# Patient Record
Sex: MALE | Race: WHITE
[De-identification: names, ages, dates, MRNs, and addresses within clinical notes are randomized per-mention and may not be internally consistent; named-entity substitution may affect disease eponyms.]

---

## 2021-02-12 ENCOUNTER — HOSPITAL ENCOUNTER (INPATIENT)
Dept: HOSPITAL 47 - EC | Age: 36
LOS: 2 days | Discharge: HOME | DRG: 299 | End: 2021-02-14
Attending: INTERNAL MEDICINE | Admitting: INTERNAL MEDICINE
Payer: COMMERCIAL

## 2021-02-12 DIAGNOSIS — Z20.822: ICD-10-CM

## 2021-02-12 DIAGNOSIS — F41.9: ICD-10-CM

## 2021-02-12 DIAGNOSIS — I82.449: ICD-10-CM

## 2021-02-12 DIAGNOSIS — I82.412: Primary | ICD-10-CM

## 2021-02-12 DIAGNOSIS — Z71.6: ICD-10-CM

## 2021-02-12 DIAGNOSIS — F17.210: ICD-10-CM

## 2021-02-12 DIAGNOSIS — Z88.2: ICD-10-CM

## 2021-02-12 DIAGNOSIS — I26.99: ICD-10-CM

## 2021-02-12 LAB
ALBUMIN SERPL-MCNC: 4.7 G/DL (ref 3.5–5)
ALP SERPL-CCNC: 117 U/L (ref 38–126)
ALT SERPL-CCNC: 62 U/L (ref 4–49)
ANION GAP SERPL CALC-SCNC: 11 MMOL/L
APTT BLD: 22 SEC (ref 22–30)
AST SERPL-CCNC: 39 U/L (ref 17–59)
BASOPHILS # BLD AUTO: 0 K/UL (ref 0–0.2)
BASOPHILS NFR BLD AUTO: 0 %
BUN SERPL-SCNC: 10 MG/DL (ref 9–20)
CALCIUM SPEC-MCNC: 9.7 MG/DL (ref 8.4–10.2)
CHLORIDE SERPL-SCNC: 103 MMOL/L (ref 98–107)
CO2 SERPL-SCNC: 21 MMOL/L (ref 22–30)
EOSINOPHIL # BLD AUTO: 0.3 K/UL (ref 0–0.7)
EOSINOPHIL NFR BLD AUTO: 2 %
ERYTHROCYTE [DISTWIDTH] IN BLOOD BY AUTOMATED COUNT: 5.8 M/UL (ref 4.3–5.9)
ERYTHROCYTE [DISTWIDTH] IN BLOOD: 12.8 % (ref 11.5–15.5)
GLUCOSE SERPL-MCNC: 117 MG/DL (ref 74–99)
HCT VFR BLD AUTO: 49.7 % (ref 39–53)
HGB BLD-MCNC: 16.8 GM/DL (ref 13–17.5)
INR PPP: 0.9 (ref ?–1.2)
LYMPHOCYTES # SPEC AUTO: 2.2 K/UL (ref 1–4.8)
LYMPHOCYTES NFR SPEC AUTO: 15 %
MCH RBC QN AUTO: 28.9 PG (ref 25–35)
MCHC RBC AUTO-ENTMCNC: 33.8 G/DL (ref 31–37)
MCV RBC AUTO: 85.6 FL (ref 80–100)
MONOCYTES # BLD AUTO: 0.8 K/UL (ref 0–1)
MONOCYTES NFR BLD AUTO: 5 %
NEUTROPHILS # BLD AUTO: 11 K/UL (ref 1.3–7.7)
NEUTROPHILS NFR BLD AUTO: 77 %
PLATELET # BLD AUTO: 258 K/UL (ref 150–450)
POTASSIUM SERPL-SCNC: 4.5 MMOL/L (ref 3.5–5.1)
PROT SERPL-MCNC: 8.3 G/DL (ref 6.3–8.2)
PT BLD: 9.8 SEC (ref 9–12)
SODIUM SERPL-SCNC: 135 MMOL/L (ref 137–145)
WBC # BLD AUTO: 14.3 K/UL (ref 3.8–10.6)

## 2021-02-12 PROCEDURE — 93306 TTE W/DOPPLER COMPLETE: CPT

## 2021-02-12 PROCEDURE — 83735 ASSAY OF MAGNESIUM: CPT

## 2021-02-12 PROCEDURE — 85379 FIBRIN DEGRADATION QUANT: CPT

## 2021-02-12 PROCEDURE — 96365 THER/PROPH/DIAG IV INF INIT: CPT

## 2021-02-12 PROCEDURE — 71275 CT ANGIOGRAPHY CHEST: CPT

## 2021-02-12 PROCEDURE — 85730 THROMBOPLASTIN TIME PARTIAL: CPT

## 2021-02-12 PROCEDURE — 84484 ASSAY OF TROPONIN QUANT: CPT

## 2021-02-12 PROCEDURE — 87635 SARS-COV-2 COVID-19 AMP PRB: CPT

## 2021-02-12 PROCEDURE — 99284 EMERGENCY DEPT VISIT MOD MDM: CPT

## 2021-02-12 PROCEDURE — 85610 PROTHROMBIN TIME: CPT

## 2021-02-12 PROCEDURE — 96376 TX/PRO/DX INJ SAME DRUG ADON: CPT

## 2021-02-12 PROCEDURE — 96375 TX/PRO/DX INJ NEW DRUG ADDON: CPT

## 2021-02-12 PROCEDURE — 96366 THER/PROPH/DIAG IV INF ADDON: CPT

## 2021-02-12 PROCEDURE — 85025 COMPLETE CBC W/AUTO DIFF WBC: CPT

## 2021-02-12 PROCEDURE — 36415 COLL VENOUS BLD VENIPUNCTURE: CPT

## 2021-02-12 PROCEDURE — 80048 BASIC METABOLIC PNL TOTAL CA: CPT

## 2021-02-12 PROCEDURE — 83880 ASSAY OF NATRIURETIC PEPTIDE: CPT

## 2021-02-12 PROCEDURE — 80053 COMPREHEN METABOLIC PANEL: CPT

## 2021-02-12 RX ADMIN — HEPARIN SODIUM SCH MLS/HR: 10000 INJECTION, SOLUTION INTRAVENOUS at 15:36

## 2021-02-12 RX ADMIN — CEFAZOLIN SCH MLS/HR: 330 INJECTION, POWDER, FOR SOLUTION INTRAMUSCULAR; INTRAVENOUS at 15:18

## 2021-02-12 RX ADMIN — HYDROMORPHONE HYDROCHLORIDE PRN MG: 1 INJECTION, SOLUTION INTRAMUSCULAR; INTRAVENOUS; SUBCUTANEOUS at 20:48

## 2021-02-12 NOTE — P.HPIM
History of Present Illness


H&P Date: 02/12/21


Chief Complaint: Left lower extremity swelling





This is a 35-year-old male with no significant past medical history who 

presented to the emergency room with left lower extremity swelling.  Patient 

said that he went to sleep last night and woke up this morning and noticed that 

his left lower extremity is significantly swollen and painful.  He said that 

this has been getting worse throughout the day.  He decided to come to the 

emergency room for further evaluation.  In the ER, he was found to have 

extensive DVT in the left lower extremity and evidence of PE on CT angiogram.  

Patient denies any chest pain or shortness of breath at this time.  He is 

hemodynamically stable.  He is slightly tachycardic.  No hypoxia.  Patient 

denies any history of blood clots in the past.  No recent surgeries or long 

trips.  He is a smoker and smokes approximately one pack per day.  He does not 

have any underlying medical history otherwise.  No family history of blood 

clots.





Review of Systems





Review of system:


14 points review of systems were obtained and were negative except to what were 

mentioned in the HPI.





Past Medical History


Past Medical History: No Reported History


History of Any Multi-Drug Resistant Organisms: None Reported


Past Surgical History: No Surgical Hx Reported


Past Psychological History: No Psychological Hx Reported


Smoking Status: Current every day smoker


Past Alcohol Use History: Occasional


Past Drug Use History: None Reported





Medications and Allergies


                                Home Medications











 Medication  Instructions  Recorded  Confirmed  Type


 


No Known Home Medications  02/12/21 02/12/21 History








                                    Allergies











Allergy/AdvReac Type Severity Reaction Status Date / Time


 


Sulfa (Sulfonamide Allergy  Nausea & Verified 02/12/21 15:14





Antibiotics)   Vomiting &  





   Diarrhea  














Physical Exam


Vitals: 


                                   Vital Signs











  Temp Pulse Resp BP Pulse Ox


 


 02/12/21 15:47   118 H  20  142/102  94 L


 


 02/12/21 14:47   121 H  18   98


 


 02/12/21 14:00   121 H  20   94 L


 


 02/12/21 13:05  98.2 F  134 H  20  161/81  98








                                Intake and Output











 02/12/21 02/12/21 02/12/21





 06:59 14:59 22:59


 


Other:   


 


  Weight  136.078 kg 














General:  The patient is awake and alert, in no distress


Eye: there is normal conjunctiva bilaterally.  


Neck:  The neck is supple, there is no  JVD.  


Cardiovascular:  Normal  S1-S2, no S3-S4, no murmurs.


Respiratory: Lungs clear to auscultation bilaterally


Gastrointestinal: Abdomen is soft, nontender


Musculoskeletal:  There is significant swelling involving the left lower 

extremity from the ankle all the way up to the hip.  


Neurological:. Speech is normal. 


Skin:  Skin is warm and dry 





Results


CBC & Chem 7: 


                                 02/12/21 13:58





                                 02/12/21 13:58


Labs: 


                  Abnormal Lab Results - Last 24 Hours (Table)











  02/12/21 02/12/21 02/12/21 Range/Units





  13:58 13:58 13:58 


 


WBC  14.3 H    (3.8-10.6)  k/uL


 


Neutrophils #  11.0 H    (1.3-7.7)  k/uL


 


D-Dimer   5.24 H   (<0.60)  mg/L FEU


 


Sodium    135 L  (137-145)  mmol/L


 


Carbon Dioxide    21 L  (22-30)  mmol/L


 


Glucose    117 H  (74-99)  mg/dL


 


ALT    62 H  (4-49)  U/L


 


Total Protein    8.3 H  (6.3-8.2)  g/dL














Assessment and Plan


Assessment: 








1.  Acute PE, with evidence of a large thrombus in the right middle and lower 

lobe pulmonary artery junction.  No evidence of right ventricular strain on CT. 

I would obtain echocardiogram for further evaluation.  Started on 

anticoagulation with IV heparin.





2.  Acute DVT involving left lower extremity with extensive clot burden





3.  Tobacco abuse, counseled extensively to quit





Today, I reviewed his medication list and lab work results.  I will consult 

vascular surgery and hematology for further evaluation.  Continue telemetry 

monitoring.  Continue IV fluid hydration with normal saline at 75 mL per hour.  

Repeat lab work in the morning.

## 2021-02-12 NOTE — ED
General Adult HPI





- General


Chief complaint: Extremity Injury, Lower


Stated complaint: L leg swollen


Time Seen by Provider: 02/12/21 13:13


Source: patient, RN notes reviewed


Mode of arrival: ambulatory


Limitations: no limitations





- History of Present Illness


Initial comments: 





Patient is a 35-year-old male that comes emergency Department complaining of l

eft lower extremity swelling.  He noted that this morning he woke up his left 

calf and thigh were swollen, tight and purple.  She did not have any history of 

bleeding disorders, prior DVTs.  He noted that he tried gluing his symptoms and 

elevating his like to see the swelling go down but it did not speak in the 

emergency room.  He denied any recent trauma injury surgery to the lower 

extremity.  He stated that his leg is not painful when he is not walking, is dry

sitting in wheelchair in no apparent distress or pain.  He did not that usual 

anxious about what could be going on.  He denied any chest pain shortness of 

breath headache nausea vomiting diarrhea constipation fever fatigue chills.





- Related Data


                                    Allergies











Allergy/AdvReac Type Severity Reaction Status Date / Time


 


Sulfa (Sulfonamide Allergy  Nausea & Verified 02/12/21 13:08





Antibiotics)   Vomiting &  





   Diarrhea  














Review of Systems


ROS Statement: 


Those systems with pertinent positive or pertinent negative responses have been 

documented in the HPI.





ROS Other: All systems not noted in ROS Statement are negative.





Past Medical History


Past Medical History: No Reported History


History of Any Multi-Drug Resistant Organisms: None Reported


Past Surgical History: No Surgical Hx Reported


Past Psychological History: No Psychological Hx Reported


Smoking Status: Current every day smoker


Past Alcohol Use History: Occasional


Past Drug Use History: None Reported





General Exam


Limitations: no limitations


General appearance: alert, in no apparent distress


Head exam: Present: atraumatic, normocephalic, normal inspection


Eye exam: Present: normal appearance, PERRL, EOMI.  Absent: scleral icterus, 

conjunctival injection, periorbital swelling


ENT exam: Present: normal exam, mucous membranes moist


Neck exam: Present: normal inspection.  Absent: tenderness, meningismus, 

lymphadenopathy


Respiratory exam: Present: normal lung sounds bilaterally.  Absent: respiratory 

distress, wheezes, rales, rhonchi, stridor


Cardiovascular Exam: Present: regular rate, normal rhythm, normal heart sounds, 

other (Weak pedal pulses left lower extremity).  Absent: systolic murmur, 

diastolic murmur, rubs, gallop, clicks


GI/Abdominal exam: Present: soft, normal bowel sounds.  Absent: distended, 

tenderness, guarding, rebound, rigid


Extremities exam: Present: normal inspection, full ROM, tenderness (Left lower 

extremity tender and tight to palpation.), normal capillary refill (Slow in left

lower extremity), calf tenderness (Left side).  Absent: pedal edema, joint 

swelling


Neurological exam: Present: alert, oriented X3, CN II-XII intact


Psychiatric exam: Present: normal affect, normal mood


Skin exam: Present: warm, dry, intact, normal color.  Absent: rash





Course


                                   Vital Signs











  02/12/21 02/12/21 02/12/21





  13:05 14:00 14:47


 


Temperature 98.2 F  


 


Pulse Rate 134 H 121 H 121 H


 


Respiratory 20 20 18





Rate   


 


Blood Pressure 161/81  


 


O2 Sat by Pulse 98 94 L 98





Oximetry   














Medical Decision Making





- Medical Decision Making





35-year-old male complaining of left lower x-ray swelling, tightness, pain on 

walking.


Venous Doppler ordered.


Patient did not want any pain medication as she was comfortable sitting in 

wheelchair.


Dr. Hussein


Heparin IV was started.


Ativan was ordered for anxiety.,


Case discussed with Dr. Quiroz, was decided to admit patient to hospital.





- Lab Data


Result diagrams: 


                                 02/12/21 13:58





                                 02/12/21 13:58


                                   Lab Results











  02/12/21 02/12/21 02/12/21 Range/Units





  13:58 13:58 13:58 


 


WBC  14.3 H    (3.8-10.6)  k/uL


 


RBC  5.80    (4.30-5.90)  m/uL


 


Hgb  16.8    (13.0-17.5)  gm/dL


 


Hct  49.7    (39.0-53.0)  %


 


MCV  85.6    (80.0-100.0)  fL


 


MCH  28.9    (25.0-35.0)  pg


 


MCHC  33.8    (31.0-37.0)  g/dL


 


RDW  12.8    (11.5-15.5)  %


 


Plt Count  258    (150-450)  k/uL


 


MPV  7.1    


 


Neutrophils %  77    %


 


Lymphocytes %  15    %


 


Monocytes %  5    %


 


Eosinophils %  2    %


 


Basophils %  0    %


 


Neutrophils #  11.0 H    (1.3-7.7)  k/uL


 


Lymphocytes #  2.2    (1.0-4.8)  k/uL


 


Monocytes #  0.8    (0-1.0)  k/uL


 


Eosinophils #  0.3    (0-0.7)  k/uL


 


Basophils #  0.0    (0-0.2)  k/uL


 


D-Dimer   5.24 H   (<0.60)  mg/L FEU


 


Sodium    135 L  (137-145)  mmol/L


 


Potassium    4.5  (3.5-5.1)  mmol/L


 


Chloride    103  ()  mmol/L


 


Carbon Dioxide    21 L  (22-30)  mmol/L


 


Anion Gap    11  mmol/L


 


BUN    10  (9-20)  mg/dL


 


Creatinine    0.86  (0.66-1.25)  mg/dL


 


Est GFR (CKD-EPI)AfAm    >90  (>60 ml/min/1.73 sqM)  


 


Est GFR (CKD-EPI)NonAf    >90  (>60 ml/min/1.73 sqM)  


 


Glucose    117 H  (74-99)  mg/dL


 


Calcium    9.7  (8.4-10.2)  mg/dL


 


Total Bilirubin    0.8  (0.2-1.3)  mg/dL


 


AST    39  (17-59)  U/L


 


ALT    62 H  (4-49)  U/L


 


Alkaline Phosphatase    117  ()  U/L


 


Total Protein    8.3 H  (6.3-8.2)  g/dL


 


Albumin    4.7  (3.5-5.0)  g/dL














- Radiology Data


Radiology results: report reviewed, image reviewed


Positive for DVT from the common femoral vein to the proximal calf veins.





Disposition


Clinical Impression: 


 Deep vein thrombosis





Disposition: ADMITTED AS IP TO THIS \[facility]\""


Condition: Good


Is patient prescribed a controlled substance at d/c from ED?: No


Referrals: 


None,Stated [Primary Care Provider] - 1-2 days

## 2021-02-12 NOTE — US
EXAMINATION TYPE: US venous doppler duplex LE LT

 

DATE OF EXAM: 2/12/2021 1:56 PM

 

COMPARISON: NONE

 

CLINICAL HISTORY: Left lower extremity swelling. Left leg pain and swelling x 1 day

 

SIDE PERFORMED: Left  

 

TECHNIQUE:  The lower extremity deep venous system is examined utilizing real time linear array sonog
boris with graded compression, doppler sonography and color-flow sonography.

 

VESSELS IMAGED:

Common Femoral Vein

Deep Femoral Vein

Greater Saphenous Vein *

Femoral Vein

Popliteal Vein

Small Saphenous Vein *

Proximal Calf Veins

(* superficial vessels)

 

 

 

Left Leg:  Positive for DVT from CFV to proximal calf veins 

 

 

 

IMPRESSION: Positive DVT as noted above.

## 2021-02-12 NOTE — CT
CT CHEST FOR PULMONARY EMBOLISM.

 

EXAMINATION TYPE: CT chest angio for PE

 

DATE OF EXAM: 2/12/2021

 

INDICATION: tachycardia

 

CT DLP: 1023 mGycm, Automated exposure control for dose reduction was used.

 

CONTRAST: Patient injected with 100 mL of Isovue 370.

 

COMPARISON: None

 

TECHNIQUE: CT of the chest is performed on a spiral scan at 2 mm thick sections.  Study is performed 
with intravenous contrast timed for evaluation for pulmonary embolism.  This will limit additional po
rtions of the evaluation.  3-D MIP images reconstructed by the technologist are reviewed on the compu
ter in the coronal and sagittal planes. 

 

FINDINGS:

There is a large thrombus at the right middle and lower lobe pulmonary artery junction. Lower lobe bi
lateral pulmonary emboli are evident. No right heart strain is evident.

 

No mediastinal or hilar adenopathy enlarged by CT criteria is evident.  The ascending aorta diameter 
at the level of the main pulmonary artery is 3.2 cm.  The main pulmonary artery diameter at the bifur
cation is 2.9 cm.

 

There is a groundglass opacity or density within the posterior left apex. Follow-up is recommended.

 

Limited CT section through the upper abdomen. There is moderate fatty infiltration liver.

 

IMPRESSIONS:

1. Pulmonary embolism.

2. Groundglass opacity or density within the left apex. This could be followed.

## 2021-02-13 LAB
ANION GAP SERPL CALC-SCNC: 9 MMOL/L
BASOPHILS # BLD AUTO: 0.1 K/UL (ref 0–0.2)
BASOPHILS NFR BLD AUTO: 1 %
BUN SERPL-SCNC: 12 MG/DL (ref 9–20)
CALCIUM SPEC-MCNC: 9 MG/DL (ref 8.4–10.2)
CHLORIDE SERPL-SCNC: 103 MMOL/L (ref 98–107)
CO2 SERPL-SCNC: 24 MMOL/L (ref 22–30)
EOSINOPHIL # BLD AUTO: 0.4 K/UL (ref 0–0.7)
EOSINOPHIL NFR BLD AUTO: 3 %
ERYTHROCYTE [DISTWIDTH] IN BLOOD BY AUTOMATED COUNT: 5.34 M/UL (ref 4.3–5.9)
ERYTHROCYTE [DISTWIDTH] IN BLOOD: 12.7 % (ref 11.5–15.5)
GLUCOSE SERPL-MCNC: 90 MG/DL (ref 74–99)
HCT VFR BLD AUTO: 46.7 % (ref 39–53)
HGB BLD-MCNC: 15.6 GM/DL (ref 13–17.5)
LYMPHOCYTES # SPEC AUTO: 3.1 K/UL (ref 1–4.8)
LYMPHOCYTES NFR SPEC AUTO: 25 %
MAGNESIUM SPEC-SCNC: 2.2 MG/DL (ref 1.6–2.3)
MCH RBC QN AUTO: 29.2 PG (ref 25–35)
MCHC RBC AUTO-ENTMCNC: 33.4 G/DL (ref 31–37)
MCV RBC AUTO: 87.5 FL (ref 80–100)
MONOCYTES # BLD AUTO: 0.7 K/UL (ref 0–1)
MONOCYTES NFR BLD AUTO: 6 %
NEUTROPHILS # BLD AUTO: 7.8 K/UL (ref 1.3–7.7)
NEUTROPHILS NFR BLD AUTO: 64 %
PLATELET # BLD AUTO: 236 K/UL (ref 150–450)
POTASSIUM SERPL-SCNC: 4.3 MMOL/L (ref 3.5–5.1)
SODIUM SERPL-SCNC: 136 MMOL/L (ref 137–145)
WBC # BLD AUTO: 12.3 K/UL (ref 3.8–10.6)

## 2021-02-13 RX ADMIN — ACETAMINOPHEN PRN MG: 325 TABLET, FILM COATED ORAL at 23:36

## 2021-02-13 RX ADMIN — HEPARIN SODIUM SCH MLS/HR: 10000 INJECTION, SOLUTION INTRAVENOUS at 23:38

## 2021-02-13 RX ADMIN — HYDROMORPHONE HYDROCHLORIDE PRN MG: 1 INJECTION, SOLUTION INTRAMUSCULAR; INTRAVENOUS; SUBCUTANEOUS at 15:07

## 2021-02-13 RX ADMIN — CEFAZOLIN SCH MLS/HR: 330 INJECTION, POWDER, FOR SOLUTION INTRAMUSCULAR; INTRAVENOUS at 06:38

## 2021-02-13 RX ADMIN — HYDROMORPHONE HYDROCHLORIDE PRN MG: 1 INJECTION, SOLUTION INTRAMUSCULAR; INTRAVENOUS; SUBCUTANEOUS at 18:34

## 2021-02-13 RX ADMIN — HEPARIN SODIUM SCH MLS/HR: 10000 INJECTION, SOLUTION INTRAVENOUS at 11:59

## 2021-02-13 RX ADMIN — ACETAMINOPHEN PRN MG: 325 TABLET, FILM COATED ORAL at 08:43

## 2021-02-13 RX ADMIN — HYDROMORPHONE HYDROCHLORIDE PRN MG: 1 INJECTION, SOLUTION INTRAMUSCULAR; INTRAVENOUS; SUBCUTANEOUS at 11:58

## 2021-02-13 RX ADMIN — HYDROMORPHONE HYDROCHLORIDE PRN MG: 1 INJECTION, SOLUTION INTRAMUSCULAR; INTRAVENOUS; SUBCUTANEOUS at 01:44

## 2021-02-13 RX ADMIN — ACETAMINOPHEN PRN MG: 325 TABLET, FILM COATED ORAL at 15:06

## 2021-02-13 RX ADMIN — HYDROMORPHONE HYDROCHLORIDE PRN MG: 1 INJECTION, SOLUTION INTRAMUSCULAR; INTRAVENOUS; SUBCUTANEOUS at 21:03

## 2021-02-13 RX ADMIN — HEPARIN SODIUM SCH MLS/HR: 10000 INJECTION, SOLUTION INTRAVENOUS at 01:39

## 2021-02-13 RX ADMIN — HYDROMORPHONE HYDROCHLORIDE PRN MG: 1 INJECTION, SOLUTION INTRAMUSCULAR; INTRAVENOUS; SUBCUTANEOUS at 06:36

## 2021-02-13 NOTE — P.GSCN
History of Present Illness


Consult date: 02/13/21


History of present illness: 





Mayito is a 35-year-old male with significant past medical history for tobacco 

abuse.  He presented to the ER for the fluctuant swelling and breathing 

difficulty and was found to have a DVT from the femoral to posterior tibial 

veins as well as a bilateral pulmonary emboli.  He otherwise has no significant 

past medical history.  He denies any recent travel.  He denies any trauma or 

recent surgical immobility.  He denies any recent illnesses.  There is no family

history of blood clots that he is aware of.  He usually works construction and 

has been somewhat slower, but still ambulates regularly in house





Review of Systems





14 point review systems performed.  Pertinent positives and negatives per the 

HPI.





Past Medical History


Past Medical History: No Reported History


History of Any Multi-Drug Resistant Organisms: None Reported


Past Surgical History: No Surgical Hx Reported


Past Anesthesia/Blood Transfusion Reactions: No Reported Reaction


Past Psychological History: No Psychological Hx Reported


Smoking Status: Current every day smoker


Past Alcohol Use History: Occasional


Additional Past Alcohol Use History / Comment(s): 1 drink on mondays


Past Drug Use History: None Reported





- Past Family History


  ** Mother


Family Medical History: No Reported History





  ** Father


History Unknown: Yes





Medications and Allergies


                                Home Medications











 Medication  Instructions  Recorded  Confirmed  Type


 


Rivaroxaban [Xarelto Starter Pack] 0 mg PO AS DIRECTED 30 Days #1 pack 02/13/21 

Rx








                                    Allergies











Allergy/AdvReac Type Severity Reaction Status Date / Time


 


Sulfa (Sulfonamide Allergy  Nausea & Verified 02/12/21 15:14





Antibiotics)   Vomiting &  





   Diarrhea  














Surgical - Exam


                                   Vital Signs











Temp Pulse Resp BP Pulse Ox


 


 98.2 F   134 H  20   161/81   98 


 


 02/12/21 13:05  02/12/21 13:05  02/12/21 13:05  02/12/21 13:05  02/12/21 13:05














genitals a pleasant cooperative obese male in no acute distress.  HEENT is 

normocephalic, atraumatic, extraction motion intact.  Heart is regular in rate 

and rhythm.  Lungs are clear bilaterally.  Abdomen is soft, nontender and 

nondistended.  Extremities with 1+ edema the left lower extremity and somewhat 

taut.  Palpable pedal pulses bilaterally.  Normal mood and affect.  Cranial 

nerves II through XII grossly intact





Results





computed tomography scan and ultrasound are reviewed.  No evidence of 

significant right heart strain on computed tomography scan.  Echocardiogram 

results reviewed.  No evidence of right heart strain





- Labs





                                 02/13/21 04:17





                                 02/13/21 04:17


                  Abnormal Lab Results - Last 24 Hours (Table)











  02/12/21 02/12/21 02/12/21 Range/Units





  13:58 13:58 13:58 


 


WBC  14.3 H    (3.8-10.6)  k/uL


 


Neutrophils #  11.0 H    (1.3-7.7)  k/uL


 


APTT     (22.0-30.0)  sec


 


D-Dimer   5.24 H   (<0.60)  mg/L FEU


 


Sodium    135 L  (137-145)  mmol/L


 


Carbon Dioxide    21 L  (22-30)  mmol/L


 


Glucose    117 H  (74-99)  mg/dL


 


ALT    62 H  (4-49)  U/L


 


Total Protein    8.3 H  (6.3-8.2)  g/dL














  02/12/21 02/13/21 02/13/21 Range/Units





  22:31 04:17 04:17 


 


WBC   12.3 H   (3.8-10.6)  k/uL


 


Neutrophils #   7.8 H   (1.3-7.7)  k/uL


 


APTT  43.7 H    (22.0-30.0)  sec


 


D-Dimer     (<0.60)  mg/L FEU


 


Sodium    136 L  (137-145)  mmol/L


 


Carbon Dioxide     (22-30)  mmol/L


 


Glucose     (74-99)  mg/dL


 


ALT     (4-49)  U/L


 


Total Protein     (6.3-8.2)  g/dL








                                 Diabetes panel











  02/12/21 02/13/21 Range/Units





  13:58 04:17 


 


Sodium  135 L  136 L  (137-145)  mmol/L


 


Potassium  4.5  4.3  (3.5-5.1)  mmol/L


 


Chloride  103  103  ()  mmol/L


 


Carbon Dioxide  21 L  24  (22-30)  mmol/L


 


BUN  10  12  (9-20)  mg/dL


 


Creatinine  0.86  0.89  (0.66-1.25)  mg/dL


 


Glucose  117 H  90  (74-99)  mg/dL


 


Calcium  9.7  9.0  (8.4-10.2)  mg/dL


 


AST  39   (17-59)  U/L


 


ALT  62 H   (4-49)  U/L


 


Alkaline Phosphatase  117   ()  U/L


 


Total Protein  8.3 H   (6.3-8.2)  g/dL


 


Albumin  4.7   (3.5-5.0)  g/dL








                                  Calcium panel











  02/12/21 02/13/21 Range/Units





  13:58 04:17 


 


Calcium  9.7  9.0  (8.4-10.2)  mg/dL


 


Albumin  4.7   (3.5-5.0)  g/dL








                                 Pituitary panel











  02/12/21 02/13/21 Range/Units





  13:58 04:17 


 


Sodium  135 L  136 L  (137-145)  mmol/L


 


Potassium  4.5  4.3  (3.5-5.1)  mmol/L


 


Chloride  103  103  ()  mmol/L


 


Carbon Dioxide  21 L  24  (22-30)  mmol/L


 


BUN  10  12  (9-20)  mg/dL


 


Creatinine  0.86  0.89  (0.66-1.25)  mg/dL


 


Glucose  117 H  90  (74-99)  mg/dL


 


Calcium  9.7  9.0  (8.4-10.2)  mg/dL








                                  Adrenal panel











  02/12/21 02/13/21 Range/Units





  13:58 04:17 


 


Sodium  135 L  136 L  (137-145)  mmol/L


 


Potassium  4.5  4.3  (3.5-5.1)  mmol/L


 


Chloride  103  103  ()  mmol/L


 


Carbon Dioxide  21 L  24  (22-30)  mmol/L


 


BUN  10  12  (9-20)  mg/dL


 


Creatinine  0.86  0.89  (0.66-1.25)  mg/dL


 


Glucose  117 H  90  (74-99)  mg/dL


 


Calcium  9.7  9.0  (8.4-10.2)  mg/dL


 


Total Bilirubin  0.8   (0.2-1.3)  mg/dL


 


AST  39   (17-59)  U/L


 


ALT  62 H   (4-49)  U/L


 


Alkaline Phosphatase  117   ()  U/L


 


Total Protein  8.3 H   (6.3-8.2)  g/dL


 


Albumin  4.7   (3.5-5.0)  g/dL














Assessment and Plan


Assessment: 


#1 left lower extremity DVT


#2 pulmonary embolism


3.tobacco abuse


Plan: 





at this point there are no signs of right heart strain and no further need for 

planned intervention.  He can can be transitioned to oral anticoagulation and 

discharged home from  standpoint.  We will follow-up with him as an outpat

ient.  He may continue activity as he can tolerate it.  He is to wear 

compression stockings and elevate his lower extremities when he can .  We did 

discuss the importance of tobacco cessation

## 2021-02-13 NOTE — ECHOF
Referral Reason:PE



MEASUREMENTS

--------

HEIGHT: 193.0 cm

WEIGHT: 145.6 kg

BP: 123/72

RVIDd:   2.6 cm     (< 3.3)

IVSd:   1.3 cm     (0.6 - 1.1)

LVIDd:   5.2 cm     (3.9 - 5.3)

LVPWd:   1.1 cm     (0.6 - 1.1)

IVSs:   1.6 cm

LVIDs:   3.5 cm

LVPWs:   1.6 cm

LA Diam:   3.7 cm     (2.7 - 3.8)

Ao Diam:   3.3 cm     (2.0 - 3.7)

AV Cusp:   2.5 cm     (1.5 - 2.6)

MV EXCURSION:   19.783 mm     (> 18.000)

MV EF SLOPE:   92 mm/s     (70 - 150)

EPSS:   0.9 cm

MV E Mark:   0.75 m/s

MV DecT:   349 ms

MV A Mark:   0.38 m/s

MV E/A Ratio:   2.01 

RAP:   5.00 mmHg

RVSP:   21.28 mmHg







FINDINGS

--------

Sinus rhythm.

This was a technically adequate study.

The left ventricular size is normal.   There is mild concentric left ventricular hypertrophy.   Overa
ll left ventricular systolic function is normal with, an EF between 60 - 65 %.

The right ventricle is normal in size.

The left atrium is normal in size.

The right atrium is normal in size.

The aortic valve is trileaflet and appears structurally normal.

The mitral valve is normal.

Trace tricuspid regurgitation present.   Right ventricular systolic pressure is normal at < 35 mmHg.

There is no pulmonic regurgitation present.

The aortic root size is normal.

There is no pericardial effusion.



CONCLUSIONS

--------

1. The left ventricular size is normal.

2. There is mild concentric left ventricular hypertrophy.

3. Overall left ventricular systolic function is normal with, an EF between 60 - 65 %.

4. Trace tricuspid regurgitation present.

5. There is no pulmonic regurgitation present.

6. There is no pericardial effusion.





SONOGRAPHER: Felicity Leonard [facility]

## 2021-02-13 NOTE — P.CONS
History of Present Illness





- Reason for Consult


Consult date: 02/13/21


DVT/PE


Requesting physician: Amparo Munguia





- Chief Complaint


Left lower extremity swelling





- History of Present Illness





Mr. Marti is a very pleasant 35-year-old  male we've been asked to 

see for left lower extremity DVT and bilateral PE.  Patient has a benign past 

medical history, he is positive for smoking 1 pack per day, no surgical history,

not on any medications for chronic medical conditions.  He denies recent long 

travel or immobility, he has been off of work but, he is intermittently 

ambulatory and around throughout the day, no injury, recent illness, steroid or 

hormonal use, there is no family history of blood clots, no family history of 

cancer.  He states that he went to bed and woke up the next day with the lower 

extremity swelling, this persisted, the leg felt tight, he started looking 

things up on the Internet and came across blood clots so, he decided to be 

evaluated.  He denies any chest pain, shortness of breath, pleuritic type chest 

pain with inspiration.





Review of Systems





14 point review of systems is negative except as stated in HPI





Past Medical History


Past Medical History: No Reported History


History of Any Multi-Drug Resistant Organisms: None Reported


Past Surgical History: No Surgical Hx Reported


Past Anesthesia/Blood Transfusion Reactions: No Reported Reaction


Past Psychological History: No Psychological Hx Reported


Smoking Status: Current every day smoker


Past Alcohol Use History: Occasional


Additional Past Alcohol Use History / Comment(s): 1 drink on mondays


Past Drug Use History: None Reported





- Past Family History


  ** Mother


Family Medical History: No Reported History





  ** Father


History Unknown: Yes





Medications and Allergies


                                Home Medications











 Medication  Instructions  Recorded  Confirmed  Type


 


No Known Home Medications  02/12/21 02/12/21 History








                                    Allergies











Allergy/AdvReac Type Severity Reaction Status Date / Time


 


Sulfa (Sulfonamide Allergy  Nausea & Verified 02/12/21 15:14





Antibiotics)   Vomiting &  





   Diarrhea  














Physical Exam


Vitals: 


                                   Vital Signs











  Temp Pulse Pulse Resp BP BP Pulse Ox


 


 02/13/21 07:54  97.9 F   95  18   130/69  93 L


 


 02/13/21 04:00  97.8 F   95  17   123/72  100


 


 02/13/21 02:00    95    


 


 02/12/21 23:13  97.8 F   95  17   118/55  97


 


 02/12/21 20:00    117 H  16   


 


 02/12/21 17:45  98.6 F   117 H  16   143/82  95


 


 02/12/21 15:47   118 H   20  142/102   94 L


 


 02/12/21 14:47   121 H   18    98


 


 02/12/21 14:00   121 H   20    94 L


 


 02/12/21 13:05  98.2 F  134 H   20  161/81   98








                                Intake and Output











 02/12/21 02/13/21 02/13/21





 22:59 06:59 14:59


 


Intake Total 620 179.977 210


 


Output Total  950 


 


Balance 620 -770.023 210


 


Intake:   


 


  IV 20 10 10


 


    Invasive Line 1 20 10 10


 


  Intake, IV Titration  169.977 





  Amount   


 


    Heparin Sod,Pork in 0.45%  169.977 





    NaCl 25,000 unit In 0.45   





    % NaCl 1 250ml.bag @ 16.   





    89 UNITS/KG/HR 22.984 mls   





    /hr IV .[id number] [facility] Rx#:   





    [id number]   


 


  Oral 600  200


 


Output:   


 


  Urine  950 


 


Other:   


 


  Voiding Method Toilet Toilet Toilet





 Urinal Urinal Urinal


 


  Weight 136.078 kg 146 kg 














- Constitutional


General appearance: cooperative, no acute distress, obese





- EENT


Eyes: anicteric sclerae, EOMI, dentition normal


ENT: hearing grossly normal, normal oropharynx





- Neck


Neck: no lymphadenopathy





- Respiratory


Respiratory: bilateral: CTA





- Cardiovascular


Rhythm: regular


Heart sounds: normal: S1, S2


Abnormal Heart Sounds: no systolic murmur, no diastolic murmur, no rub, no S3 

Gallop, no S4 Gallop, no click, no other


  ** leg


Peripheral Edema: right: None, left: 3+





- Gastrointestinal


General gastrointestinal: no absent bowel sounds, no decreased bowel sounds, no 

distended, no hepatomegaly, no hyperactive bowel sounds, normal bowel sounds, no

organomegaly, no rigid, no scaphoid, soft, no splenomegaly, no tenderness, no 

umbilical hernia, no ventral hernia





- Integumentary


Integumentary: normal





- Neurologic


Neurologic: CNII-XII intact





- Musculoskeletal


Musculoskeletal: strength equal bilaterally





- Psychiatric


Psychiatric: A&O x's 3, appropriate affect, intact judgment & insight





Results


CBC & Chem 7: 


                                 02/13/21 04:17





                                 02/13/21 04:17


Labs: 


                  Abnormal Lab Results - Last 24 Hours (Table)











  02/12/21 02/12/21 02/12/21 Range/Units





  13:58 13:58 13:58 


 


WBC  14.3 H    (3.8-10.6)  k/uL


 


Neutrophils #  11.0 H    (1.3-7.7)  k/uL


 


APTT     (22.0-30.0)  sec


 


D-Dimer   5.24 H   (<0.60)  mg/L FEU


 


Sodium    135 L  (137-145)  mmol/L


 


Carbon Dioxide    21 L  (22-30)  mmol/L


 


Glucose    117 H  (74-99)  mg/dL


 


ALT    62 H  (4-49)  U/L


 


Total Protein    8.3 H  (6.3-8.2)  g/dL














  02/12/21 02/13/21 02/13/21 Range/Units





  22:31 04:17 04:17 


 


WBC   12.3 H   (3.8-10.6)  k/uL


 


Neutrophils #   7.8 H   (1.3-7.7)  k/uL


 


APTT  43.7 H    (22.0-30.0)  sec


 


D-Dimer     (<0.60)  mg/L FEU


 


Sodium    136 L  (137-145)  mmol/L


 


Carbon Dioxide     (22-30)  mmol/L


 


Glucose     (74-99)  mg/dL


 


ALT     (4-49)  U/L


 


Total Protein     (6.3-8.2)  g/dL











CT scan - chest: report reviewed


Venous US: report reviewed





Assessment and Plan


(1) Pulmonary embolism


Current Visit: Yes   Status: Acute   Priority: High   Code(s): I26.99 - OTHER 

PULMONARY EMBOLISM WITHOUT ACUTE COR PULMONALE   SNOMED Code(s): 63401432


   





(2) Deep vein thrombosis


Current Visit: Yes   Status: Acute   Priority: High   Code(s): I82.409 - ACUTE 

EMBOLISM AND THOMBOS UNSP DEEP VN UNSP LOWER EXTREMITY   SNOMED Code(s): 

314183990


   


Plan: 





Weak provoking factor of smoking and this 35-year-old male with left lower 

extremity DVT and bilateral PE. Would recommend 6mo to 1 year anticoauglation.  

Hypercoaguable work up outpt. 


Patient is on heparin drip.  Send prescription for Xarelto to verify co-pay.  

Patient said he would pay for his but, we will see if we can get him any type of

assistance.


Reviewed bleeding precautions with the patient.


Case briefly discussed with Vascular MD.


Continue with heparin treatment until verification of oral anticoagulation 

confirmed and the patient can get the prescription.  He is still currently 

pending the ECHO results.

## 2021-02-14 VITALS
HEART RATE: 90 BPM | RESPIRATION RATE: 16 BRPM | TEMPERATURE: 97.4 F | SYSTOLIC BLOOD PRESSURE: 120 MMHG | DIASTOLIC BLOOD PRESSURE: 65 MMHG

## 2021-02-14 LAB
BASOPHILS # BLD AUTO: 0.1 K/UL (ref 0–0.2)
BASOPHILS NFR BLD AUTO: 1 %
EOSINOPHIL # BLD AUTO: 0.5 K/UL (ref 0–0.7)
EOSINOPHIL NFR BLD AUTO: 5 %
ERYTHROCYTE [DISTWIDTH] IN BLOOD BY AUTOMATED COUNT: 5.13 M/UL (ref 4.3–5.9)
ERYTHROCYTE [DISTWIDTH] IN BLOOD: 13.1 % (ref 11.5–15.5)
HCT VFR BLD AUTO: 45 % (ref 39–53)
HGB BLD-MCNC: 15.1 GM/DL (ref 13–17.5)
LYMPHOCYTES # SPEC AUTO: 2.7 K/UL (ref 1–4.8)
LYMPHOCYTES NFR SPEC AUTO: 30 %
MCH RBC QN AUTO: 29.4 PG (ref 25–35)
MCHC RBC AUTO-ENTMCNC: 33.6 G/DL (ref 31–37)
MCV RBC AUTO: 87.6 FL (ref 80–100)
MONOCYTES # BLD AUTO: 0.6 K/UL (ref 0–1)
MONOCYTES NFR BLD AUTO: 7 %
NEUTROPHILS # BLD AUTO: 4.9 K/UL (ref 1.3–7.7)
NEUTROPHILS NFR BLD AUTO: 55 %
PLATELET # BLD AUTO: 236 K/UL (ref 150–450)
WBC # BLD AUTO: 8.9 K/UL (ref 3.8–10.6)

## 2021-02-14 RX ADMIN — ACETAMINOPHEN PRN MG: 325 TABLET, FILM COATED ORAL at 08:59

## 2021-02-14 RX ADMIN — HYDROMORPHONE HYDROCHLORIDE PRN MG: 1 INJECTION, SOLUTION INTRAMUSCULAR; INTRAVENOUS; SUBCUTANEOUS at 06:51

## 2021-02-14 RX ADMIN — HYDROMORPHONE HYDROCHLORIDE PRN MG: 1 INJECTION, SOLUTION INTRAMUSCULAR; INTRAVENOUS; SUBCUTANEOUS at 12:03

## 2021-02-14 RX ADMIN — HEPARIN SODIUM SCH MLS/HR: 10000 INJECTION, SOLUTION INTRAVENOUS at 08:58

## 2021-02-14 RX ADMIN — HYDROMORPHONE HYDROCHLORIDE PRN MG: 1 INJECTION, SOLUTION INTRAMUSCULAR; INTRAVENOUS; SUBCUTANEOUS at 10:47

## 2021-02-14 NOTE — P.DS
Providers


Date of admission: 


02/12/21 16:56





Expected date of discharge: 02/14/21


Attending physician: 


J Carlos Hussein MD





Consults: 





                                        





02/12/21 16:06


Consult Physician Routine 


   Consulting Provider: Dawn Gilbert


   Consult Reason/Comments: PE/DVT


   Do you want consulting provider notified?: Yes





02/12/21 16:07


Consult Physician Routine 


   Consulting Provider: Prasanna Barroso


   Consult Reason/Comments: DVT/PE


   Do you want consulting provider notified?: Yes











Primary care physician: 


Stated None





Hospital Course: 





This is a 35-year-old male with no known past medical history of presented to [facility] emergency room with sudden onset left lower extremity swelling that happened 

overnight.  He was evaluated in the ER and admitted to the hospital for further 

management of his medical problems noted below.





1.  Acute PE, with evidence of a large thrombus in the right middle and lower 

lobe pulmonary artery junction.  No evidence of right ventricular strain on CT 

or echocardiogram.  Patient was seen and evaluated by hematology and vascular 

surgery during this admission.





2.  Acute DVT involving left lower extremity with extensive clot burden.  

Continue leg elevation.  Pain control as needed.





3.  Tobacco abuse, counseled extensively to quit





Patient was admitted to the hospital and treated with IV heparin drip.  He will 

be transitioned to oral Eliquis 10 mg twice a day for one week and 5 mg twice 

daily thereafter.  Patient does not have insurance and was comfortable to pay 

out of pocket.  I offered her Coumadin as a cheaper alternative but he did not 

want to worry about having his INR checked.





He will be discharged home in a stable condition.  He will follow-up with 

hematology and vascular surgery as directed.  Hypercoagulable workup to be done 

in the office.





Patient will be discharged home in a stable condition.  For further details 

about this hospitalization please refer to the electronic chart.  Time spent on 

discharge > 30 minutes including counseling and coordination of care





Physical exam:


General:  The patient is awake and alert, in no distress


Eye: there is normal conjunctiva bilaterally.  


Neck:  The neck is supple, there is no  JVD.  


Cardiovascular:  Normal  S1-S2, no S3-S4, no murmurs.


Respiratory: Lungs clear to auscultation bilaterally


Gastrointestinal: Abdomen is soft, nontender


Musculoskeletal:  There significant swelling of the left lower extremity 

improving compared to yesterday


Neurological:. Speech is normal. 


Skin:  Skin is warm and dry 


Patient Condition at Discharge: Stable





Plan - Discharge Summary


Discharge Rx Participant: Yes


New Discharge Prescriptions: 


New


   Apixaban [Eliquis] 5 mg PO BID #60 tab


Discharge Medication List





Apixaban [Eliquis] 5 mg PO BID #60 tab 02/14/21 [Rx]








Follow up Appointment(s)/Referral(s): 


Prasanna Barroso MD [STAFF PHYSICIAN] - 1 Week (Lab draw 1st visit, then see MD for 

results and recommendations)


Dawn Gilbert DO [STAFF PHYSICIAN] - 2 Weeks


Patient Instructions/Handouts:  Pulmonary Embolism (ED), Deep Vein Thrombosis 

(ED)


Activity/Diet/Wound Care/Special Instructions: 


Xarelto 1,010/ month


eliquis 738/ month








Discharge Disposition: HOME SELF-CARE